# Patient Record
Sex: FEMALE | Race: WHITE | NOT HISPANIC OR LATINO | Employment: OTHER | URBAN - METROPOLITAN AREA
[De-identification: names, ages, dates, MRNs, and addresses within clinical notes are randomized per-mention and may not be internally consistent; named-entity substitution may affect disease eponyms.]

---

## 2020-12-30 ENCOUNTER — TRANSFERRED RECORDS (OUTPATIENT)
Dept: HEALTH INFORMATION MANAGEMENT | Facility: CLINIC | Age: 75
End: 2020-12-30

## 2021-01-21 ENCOUNTER — TRANSFERRED RECORDS (OUTPATIENT)
Dept: HEALTH INFORMATION MANAGEMENT | Facility: CLINIC | Age: 76
End: 2021-01-21

## 2021-03-12 ENCOUNTER — TRANSFERRED RECORDS (OUTPATIENT)
Dept: HEALTH INFORMATION MANAGEMENT | Facility: CLINIC | Age: 76
End: 2021-03-12

## 2021-04-15 ENCOUNTER — TRANSFERRED RECORDS (OUTPATIENT)
Dept: HEALTH INFORMATION MANAGEMENT | Facility: CLINIC | Age: 76
End: 2021-04-15

## 2021-05-03 ENCOUNTER — MEDICAL CORRESPONDENCE (OUTPATIENT)
Dept: HEALTH INFORMATION MANAGEMENT | Facility: CLINIC | Age: 76
End: 2021-05-03

## 2021-05-03 ENCOUNTER — TRANSFERRED RECORDS (OUTPATIENT)
Dept: HEALTH INFORMATION MANAGEMENT | Facility: CLINIC | Age: 76
End: 2021-05-03

## 2021-05-05 ENCOUNTER — TRANSCRIBE ORDERS (OUTPATIENT)
Dept: OTHER | Age: 76
End: 2021-05-05

## 2021-05-05 DIAGNOSIS — R29.898 BILATERAL ARM WEAKNESS: Primary | ICD-10-CM

## 2021-05-05 DIAGNOSIS — R44.9 SENSORY DEFICIT, BILATERAL: ICD-10-CM

## 2021-06-22 ENCOUNTER — PRE VISIT (OUTPATIENT)
Dept: NEUROLOGY | Facility: CLINIC | Age: 76
End: 2021-06-22

## 2021-06-22 NOTE — TELEPHONE ENCOUNTER
FUTURE VISIT INFORMATION      FUTURE VISIT INFORMATION:    Date: 6/28/2021    Time: 1pm    [place] [facility] [place]ERRAL INFORMATION:    Referring provider:  Dr. Pfeiffer    Referring providers clinic:  [facility]     Reason for visit/diagnosis  Bilateral Arm Weakness     RECORDS REQUESTED FROM:       Clinic name Comments Records Status Imaging Status   [facility] Pfeiffer-5/4/2021    MRI Head-1/22/2021    MRI Cervical Spine-12/30/2020 Care Everywhere Requested to PACS                                   [facility]22[facility]2021[facility]    6/28/2021-[facility] Images now in PACS-MR @ 528am

## 2021-06-28 ENCOUNTER — OFFICE VISIT (OUTPATIENT)
Dept: NEUROLOGY | Facility: CLINIC | Age: 76
End: 2021-06-28
Payer: COMMERCIAL

## 2021-06-28 VITALS
RESPIRATION RATE: 16 BRPM | WEIGHT: 195 LBS | HEART RATE: 82 BPM | SYSTOLIC BLOOD PRESSURE: 130 MMHG | DIASTOLIC BLOOD PRESSURE: 79 MMHG | OXYGEN SATURATION: 95 %

## 2021-06-28 DIAGNOSIS — M19.019 AC JOINT ARTHROPATHY: Primary | ICD-10-CM

## 2021-06-28 PROCEDURE — 99205 OFFICE O/P NEW HI 60 MIN: CPT | Mod: 95 | Performed by: PSYCHIATRY & NEUROLOGY

## 2021-06-28 RX ORDER — MONTELUKAST SODIUM 10 MG/1
TABLET ORAL
COMMUNITY
Start: 2021-06-17

## 2021-06-28 RX ORDER — ATORVASTATIN CALCIUM 10 MG/1
TABLET, FILM COATED ORAL
COMMUNITY
Start: 2021-05-04

## 2021-06-28 RX ORDER — FLUTICASONE PROPIONATE 50 MCG
1 SPRAY, SUSPENSION (ML) NASAL
COMMUNITY

## 2021-06-28 RX ORDER — BUDESONIDE AND FORMOTEROL FUMARATE DIHYDRATE 160; 4.5 UG/1; UG/1
AEROSOL RESPIRATORY (INHALATION)
COMMUNITY
Start: 2020-03-30

## 2021-06-28 RX ORDER — PANTOPRAZOLE SODIUM 40 MG/1
1 FOR SUSPENSION ORAL DAILY
COMMUNITY

## 2021-06-28 RX ORDER — ALBUTEROL SULFATE 90 UG/1
AEROSOL, METERED RESPIRATORY (INHALATION)
COMMUNITY
Start: 2021-05-17

## 2021-06-28 RX ORDER — ASCORBIC ACID 500 MG
500 TABLET ORAL
COMMUNITY

## 2021-06-28 RX ORDER — OMEPRAZOLE 10 MG/1
10 CAPSULE, DELAYED RELEASE ORAL
COMMUNITY
Start: 2020-12-31

## 2021-06-28 RX ORDER — WARFARIN SODIUM 5 MG/1
TABLET ORAL
COMMUNITY
Start: 2021-06-21

## 2021-06-28 RX ORDER — FUROSEMIDE 20 MG
20 TABLET ORAL
COMMUNITY
Start: 2020-12-17

## 2021-06-28 RX ORDER — ALENDRONATE SODIUM 70 MG/1
70 TABLET ORAL WEEKLY
COMMUNITY
Start: 2021-05-10

## 2021-06-28 ASSESSMENT — PAIN SCALES - GENERAL: PAINLEVEL: EXTREME PAIN (8)

## 2021-06-28 NOTE — PROGRESS NOTES
[facility] Neurology Consultation    Tameka Mcdaniel MRN# [id number]   Age: 76 year old YOB: 1945     Requesting physician: Provider Not In System  Erica Croft     Reason for Consultation: arm pain and weakness      History of Presenting Symptoms:   Tameka Mcdaniel is a 76 year old female who presents today for evaluation of arm pain and weakness.  In prior visits, the patient reported b/l arm pain with hand weakness since 10/2020 that had progressed over time.  She was seen with Neurosurgery 2021 at [facility] and this led to imaging that was somewhat unrevealing for etiology.  She then tried PT, and injections which were somewhat helpful.    The patient was seen with Neurology at [facility] 3/12/2021 for acute onset b/l neck and arm pain (aching, shooting pains down arm into 2nd and third finger).  Examination showed weakness in b/l Deltoid 3+, 4+ biceps, brachioradialis, triceps, finger extension/flexion/extention ~ all limited by pain.  Normal reflexes, impaired sensation in distal legs b/l.  Imaging showed moderate cerebral and cerebellar atrophy, and degenerative spondylosis of cervical spine (no critical stenosis).  Basic las as well as an EMG was done to further determine her etiology.  Comments made after EMG (below) indicate the patient was to see a rheumatologist for shoulder pain in consideration of polymyalgia rheumatica and/or polymyositis, as well as carcinomatosis with CT chest/abdomen/pelvis.  The patient was referred to a pain clinic, and reumatology on 2021 follow up with her PCP.    Serum on 3/13/2021: B12 (414), CK (48), Aldolase (5.7), MMA (0.18), A1c (6.0) - all normal except for mildly elevated A1c.    EM/15/2021 - minimally abnormal nerve conduction study because of newly reduced sensory nerve conduction velocities.  There is no evidence of median entrapment neuropathy at either wrist.  No specific comment can be made about cervical radiculopathy and/or myopathy because  needle EMG examination could not be done.    Today, the patient still has weakness in lifting objects with her hands (plates, light-weights).  She still has pain in both of her shoulders (not neck, not upper back).  This pain is described as an ache with sharp onset (like someone hit her).  The pain shoots down her arms at times, usually when she is active.  When it shoots down her arms, it goes anteriority toward her index and thumb.  She doesn't get this pain with neck extension or flexion.  There have been no new symptoms developing since onset in 10/2020.  Her onset of pain and weakness was relatively acute, as she describes waking up with her pain.  As long as she keeps her hands and arms in front of her, she only has dull shoulder and proximal arm pain.      Past Medical History:   GERD  HLD  Aortic stenosis  2012, s/p valve replacement 5/2/2002 (on chronic anticoagulation)  TMJ  Venous stasis     Past Surgical History:   Tonsil and adenoidectomy  Cataract extraction b/l     Social History:   Smoked (quit 25 years ago). Doesn't drink.    Family Hx:  Mother - Pick's disease  Brother - likely Alzheimer's dementia     Medications:     Current Outpatient Medications   Medication     albuterol (PROAIR HFA/PROVENTIL HFA/VENTOLIN HFA) 108 (90 Base) MCG/ACT inhaler     alendronate (FOSAMAX) 70 MG tablet     budesonide-formoterol (SYMBICORT) 160-4.5 MCG/ACT Inhaler     fluticasone-salmeterol (ADVAIR) 250-50 MCG/DOSE inhaler     furosemide (LASIX) 20 MG tablet     omeprazole (PRILOSEC) 10 MG DR capsule     atorvastatin (LIPITOR) 10 MG tablet     cholecalciferol 25 MCG (1000 UT) TABS     fluticasone (FLONASE) 50 MCG/ACT nasal spray     JANTOVEN ANTICOAGULANT 5 MG tablet     montelukast (SINGULAIR) 10 MG tablet     vitamin C (ASCORBIC ACID) 500 MG tablet      Physical Exam:   Vitals: /79   Pulse 82   Resp 16   Wt 88.5 kg (195 lb)   SpO2 95%    General: Seated comfortably in no acute distress. Laughing often,  difficulty with complex directions.  HEENT: Neck supple with normal range of motion. No paracervical muscle tenderness or tightness. Anterior scalene insertion, AC joint, and coracoid process joint pressure leads to extreme pain and return of symptoms reported above.  B/l field cut on inferior visual field in both eyes.  Skin: No rashes  Neurologic:     Mental Status: Fully alert, attentive and oriented. Speech clear and fluent, no paraphasic errors.      Cranial Nerves: Visual fields intact. PERRL. EOMI with normal smooth pursuit. Facial sensation intact/symmetric. Facial movements symmetric. Hearing not formally tested but intact to conversation. Palate elevation symmetric, uvula midline. No dysarthria. Shoulder shrug strong bilaterally. Tongue protrusion midline. SCM 5/5 b/l.      Motor: No tremors or other abnormal movements observed. Muscle tone normal throughout. No pronator drift. Normal/symmetric rapid finger tapping. Strength 5/5 throughout upper and lower extremities (some pain limitations to most movements, but patient can overcome with full effort.  No weakness noted with any movements in particular SA, BF, TE, WE, WF, FF, index extension, thumb opposition, thumb flexion, Finger abduction, HF, HE, KE, KF, DF, PF).     Deep Tendon Reflexes: 2+/symmetric throughout upper and lower extremities (no spread). No clonus. Toes downgoing bilaterally.     Sensory: Intact/symmetric to light touch, pinprick, temperature, vibration and proprioception throughout upper and lower extremities (no dermatomal loss or change in sensation along C3, 4, 5, 6, 7, 8 dermatome). Negative Romberg (hesitant and wary of test, but doesn't fall or sway even with high degree of anxiety)     Coordination: Finger-nose-finger with some intention tremor and ataxia.  Rapid alternating movements intact/symmetric with normal speed and rhythm.     Gait: Normal, steady casual gait. Tandem is poor due to hesitancy.         Data: Pertinent prior  to visit   Imagin2021: MRI head with and without contrast: Moderate cerebral and cerebellar atrophy and mild to moderate small vessel disease    2020: MRI cervical spine: Multilevel degenerative spondylosis, C5-6 disc osteophyte abuts the anterior portion of the cord. Moderate left neural foraminal narrowing.          Assessment and Plan:   Assessment:  B/l AC joint and shoulder arthropathy    The patient has no noted weakness on exam not related to shoulder pain, has + findings on AC joint and coracoid process palpation reproducing symptoms, and has cervical radicular signs (neck compression, no dermatome or myotome changes).  Overall, while certainly rheumatological etiologies and neruological etiologies remain for b/l arm pain, her symptoms are more characteristic for musculoskeletal arthropathy or inflammation and should be investigated further with an orthopedics specialist or her PCP.  If treatment or w/up in this regard is negative, then repeat EMG can be done at/near our follow up visit. I did ask the patient to consider further treatment of her pain with a pain clinic as well.     Plan:  Orthopedics referral for b/l shoulder inflammation    Follow up in Neurology clinic in 2 months or should new concerns arise.    LIZZ Solares D.O.   of Neurology      Total time  today (62 min) in this patient encounter was spent on pre-charting, counseling and/or coordination of care. We reviewed diagnostic results, impressions, and discussed other possible tests if symptoms do not improve. We discussed the implications of the diagnosis, as well as risks and benefits of management options. We reviewed treatment instructions and our scheduled follow-up as specified in the discharge plan. We also discussed the importance of compliance with the chosen course of treatment. The patient is in agreement with this plan and has no further questions.

## 2021-06-28 NOTE — NURSING NOTE
Chief Complaint   Patient presents with     Consult     [facility] NEW - bilateral arm weakness     Samuel Quintana

## 2021-06-28 NOTE — LETTER
6/28/2021       RE: Tameka Mcdaniel  [address]  [facility] [place] [address]     Dear Colleague,    Thank you for referring your patient, Tameka Mcdaniel, to the [facility] [place] at [facility]. Please see a copy of my visit note below.    [facility] Neurology Consultation    Tameka Mcdaniel MRN# [id number]   Age: 76 year old YOB: 1945     Requesting physician: Provider Not In System  Erica Croft     Reason for Consultation: arm pain and weakness      History of Presenting Symptoms:   Tameka Mcdaniel is a 76 year old female who presents today for evaluation of arm pain and weakness.  In prior visits, the patient reported b/l arm pain with hand weakness since 10/2020 that had progressed over time.  She was seen with Neurosurgery 1/12/2021 at [facility] and this led to imaging that was somewhat unrevealing for etiology.  She then tried PT, and injections which were somewhat helpful.    The patient was seen with Neurology at [facility] 3/12/2021 for acute onset b/l neck and arm pain (aching, shooting pains down arm into 2nd and third finger).  Examination showed weakness in b/l Deltoid 3+, 4+ biceps, brachioradialis, triceps, finger extension/flexion/extention ~ all limited by pain.  Normal reflexes, impaired sensation in distal legs b/l.  Imaging showed moderate cerebral and cerebellar atrophy, and degenerative spondylosis of cervical spine (no critical stenosis).  Basic las as well as an EMG was done to further determine her etiology.  Comments made after EMG (below) indicate the patient was to see a rheumatologist for shoulder pain in consideration of polymyalgia rheumatica and/or polymyositis, as well as carcinomatosis with CT chest/abdomen/pelvis.  The patient was referred to a pain clinic, and reumatology on 5/4/2021 follow up with her PCP.    Serum on 3/13/2021: B12 (414), CK (48), Aldolase (5.7), MMA (0.18), A1c (6.0) - all normal except for  mildly elevated A1c.    EM/15/2021 - minimally abnormal nerve conduction study because of newly reduced sensory nerve conduction velocities.  There is no evidence of median entrapment neuropathy at either wrist.  No specific comment can be made about cervical radiculopathy and/or myopathy because needle EMG examination could not be done.    Today, the patient still has weakness in lifting objects with her hands (plates, light-weights).  She still has pain in both of her shoulders (not neck, not upper back).  This pain is described as an ache with sharp onset (like someone hit her).  The pain shoots down her arms at times, usually when she is active.  When it shoots down her arms, it goes anteriority toward her index and thumb.  She doesn't get this pain with neck extension or flexion.  There have been no new symptoms developing since onset in 10/2020.  Her onset of pain and weakness was relatively acute, as she describes waking up with her pain.  As long as she keeps her hands and arms in front of her, she only has dull shoulder and proximal arm pain.      Past Medical History:   GERD  HLD  Aortic stenosis  , s/p valve replacement 2002 (on chronic anticoagulation)  TMJ  Venous stasis     Past Surgical History:   Tonsil and adenoidectomy  Cataract extraction b/l     Social History:   Smoked (quit 25 years ago). Doesn't drink.    Family Hx:  Mother - Pick's disease  Brother - likely Alzheimer's dementia     Medications:     Current Outpatient Medications   Medication     albuterol (PROAIR HFA/PROVENTIL HFA/VENTOLIN HFA) 108 (90 Base) MCG/ACT inhaler     alendronate (FOSAMAX) 70 MG tablet     budesonide-formoterol (SYMBICORT) 160-4.5 MCG/ACT Inhaler     fluticasone-salmeterol (ADVAIR) 250-50 MCG/DOSE inhaler     furosemide (LASIX) 20 MG tablet     omeprazole (PRILOSEC) 10 MG DR capsule     atorvastatin (LIPITOR) 10 MG tablet     cholecalciferol 25 MCG (1000 UT) TABS     fluticasone (FLONASE) 50 MCG/ACT  nasal spray     JANTOVEN ANTICOAGULANT 5 MG tablet     montelukast (SINGULAIR) 10 MG tablet     vitamin C (ASCORBIC ACID) 500 MG tablet      Physical Exam:   Vitals: /79   Pulse 82   Resp 16   Wt 88.5 kg (195 lb)   SpO2 95%    General: Seated comfortably in no acute distress. Laughing often, difficulty with complex directions.  HEENT: Neck supple with normal range of motion. No paracervical muscle tenderness or tightness. Anterior scalene insertion, AC joint, and coracoid process joint pressure leads to extreme pain and return of symptoms reported above.  B/l field cut on inferior visual field in both eyes.  Skin: No rashes  Neurologic:     Mental Status: Fully alert, attentive and oriented. Speech clear and fluent, no paraphasic errors.      Cranial Nerves: Visual fields intact. PERRL. EOMI with normal smooth pursuit. Facial sensation intact/symmetric. Facial movements symmetric. Hearing not formally tested but intact to conversation. Palate elevation symmetric, uvula midline. No dysarthria. Shoulder shrug strong bilaterally. Tongue protrusion midline. SCM 5/5 b/l.      Motor: No tremors or other abnormal movements observed. Muscle tone normal throughout. No pronator drift. Normal/symmetric rapid finger tapping. Strength 5/5 throughout upper and lower extremities (some pain limitations to most movements, but patient can overcome with full effort.  No weakness noted with any movements in particular SA, BF, TE, WE, WF, FF, index extension, thumb opposition, thumb flexion, Finger abduction, HF, HE, KE, KF, DF, PF).     Deep Tendon Reflexes: 2+/symmetric throughout upper and lower extremities (no spread). No clonus. Toes downgoing bilaterally.     Sensory: Intact/symmetric to light touch, pinprick, temperature, vibration and proprioception throughout upper and lower extremities (no dermatomal loss or change in sensation along C3, 4, 5, 6, 7, 8 dermatome). Negative Romberg (hesitant and wary of test, but  doesn't fall or sway even with high degree of anxiety)     Coordination: Finger-nose-finger with some intention tremor and ataxia.  Rapid alternating movements intact/symmetric with normal speed and rhythm.     Gait: Normal, steady casual gait. Tandem is poor due to hesitancy.         Data: Pertinent prior to visit   Imagin2021: MRI head with and without contrast: Moderate cerebral and cerebellar atrophy and mild to moderate small vessel disease    2020: MRI cervical spine: Multilevel degenerative spondylosis, C5-6 disc osteophyte abuts the anterior portion of the cord. Moderate left neural foraminal narrowing.          Assessment and Plan:   Assessment:  B/l AC joint and shoulder arthropathy    The patient has no noted weakness on exam not related to shoulder pain, has + findings on AC joint and coracoid process palpation reproducing symptoms, and has cervical radicular signs (neck compression, no dermatome or myotome changes).  Overall, while certainly rheumatological etiologies and neruological etiologies remain for b/l arm pain, her symptoms are more characteristic for musculoskeletal arthropathy or inflammation and should be investigated further with an orthopedics specialist or her PCP.  If treatment or w/up in this regard is negative, then repeat EMG can be done at/near our follow up visit. I did ask the patient to consider further treatment of her pain with a pain clinic as well.     Plan:  Orthopedics referral for b/l shoulder inflammation    Follow up in Neurology clinic in 2 months or should new concerns arise.    LIZZ Solares D.O.   of Neurology      Total time  today (62 min) in this patient encounter was spent on pre-charting, counseling and/or coordination of care. We reviewed diagnostic results, impressions, and discussed other possible tests if symptoms do not improve. We discussed the implications of the diagnosis, as well as risks and benefits of management  options. We reviewed treatment instructions and our scheduled follow-up as specified in the discharge plan. We also discussed the importance of compliance with the chosen course of treatment. The patient is in agreement with this plan and has no further questions.        Again, thank you for allowing me to participate in the care of your patient.      Sincerely,    Israel Solares, DO

## 2021-06-28 NOTE — PATIENT INSTRUCTIONS
You have extreme pain with palpation to certain regions of your shoulder, along with pain that doesn't radiate in a usual dermatome or radiculopathy way.  I suspect you have arthropathy/joint injury to your shoulders leading to arm pain.  You may benefit from seeing a sports medicine doctor/orthopedics doctor about this.  - Orthopedics referral    If treatment with orthopedics isn't helpful, then repeat EMG will be needed, and this can be done or discussed at our return visit in 2-3 months.

## 2021-06-29 NOTE — TELEPHONE ENCOUNTER
DIAGNOSIS: AC joint arthropathy /Dr Solares/ no images/ [facility]/ ortho con   APPOINTMENT DATE: 6/30/21   NOTES STATUS DETAILS   OFFICE NOTE from referring provider Internal Israel Solares DO in [facility]   OFFICE NOTE from other specialist Care Everywhere [facility]   DISCHARGE SUMMARY from hospital N/A    DISCHARGE REPORT from the ER N/A    OPERATIVE REPORT N/A    EMG report recieved 4/15/21  Scanned to chart   MEDICATION LIST Internal    MRI Received CERVICAL SPINE 12/30/20   DEXA (osteoporosis/bone health) N/A    CT SCAN N/A    XRAYS (IMAGES & REPORTS) Received  RIGHT SHOULDER 9/3/20     Action 6/29/21 [facility]   Action Taken REQUESTED IMAGING FROM [facility]

## 2021-06-30 ENCOUNTER — ANCILLARY PROCEDURE (OUTPATIENT)
Dept: GENERAL RADIOLOGY | Facility: CLINIC | Age: 76
End: 2021-06-30
Attending: FAMILY MEDICINE
Payer: COMMERCIAL

## 2021-06-30 ENCOUNTER — PRE VISIT (OUTPATIENT)
Dept: ORTHOPEDICS | Facility: CLINIC | Age: 76
End: 2021-06-30

## 2021-06-30 ENCOUNTER — OFFICE VISIT (OUTPATIENT)
Dept: ORTHOPEDICS | Facility: CLINIC | Age: 76
End: 2021-06-30
Payer: COMMERCIAL

## 2021-06-30 DIAGNOSIS — G89.29 CHRONIC PAIN OF BOTH SHOULDERS: Primary | ICD-10-CM

## 2021-06-30 DIAGNOSIS — M25.511 CHRONIC PAIN OF BOTH SHOULDERS: Primary | ICD-10-CM

## 2021-06-30 DIAGNOSIS — M25.512 CHRONIC PAIN OF BOTH SHOULDERS: Primary | ICD-10-CM

## 2021-06-30 PROCEDURE — 99205 OFFICE O/P NEW HI 60 MIN: CPT | Performed by: FAMILY MEDICINE

## 2021-06-30 PROCEDURE — 73030 X-RAY EXAM OF SHOULDER: CPT | Mod: RT | Performed by: RADIOLOGY

## 2021-06-30 NOTE — PROGRESS NOTES
CHIEF COMPLAINT:  Pain of the Right Shoulder and Pain of the Left Shoulder       HISTORY OF PRESENT ILLNESS  Ms. Mcdaniel is a pleasant 76 year old year old female who presents to clinic today with bilateral shoulder pain and weakness.  Tameka explains that she gets a sharp, shooting, throbbing pain down both arms and sometimes into her hands. She was seen in Neurology and had an EMG completed.  Fortunately she was on anticoagulation and could not proceed with EMG portion of test.  Neurology deemed condition likely orthopedic and referred to our office for further investigation.    Labs by PCP including inflammatory markers, creatinine, aldolase,  Negative  MRI head and cervical negative.    Onset: gradual  Location: bilateral shoulder  Quality:  stabbing, sharp, shooting and throbing  Duration: 6 months   Severity: 10/10 at worst  Timing:constant  Modifying factors:  resting and non-use makes it better, movement and use makes it worse  Associated signs & symptoms: pain  Previous similar pain: No  Treatments to date:Had an injection in both shoulders back in October unsure what kind and that only provided about 6 weeks of relief.     Additional history: as documented    Review of Systems:    Have you recently had a a fever, chills, weight loss? No    Do you have any vision problems? No    Do you have any chest pain or edema? No    Do you have any shortness of breath or wheezing?  No    Do you have stomach problems? No    Do you have any numbness or focal weakness? No    Do you have diabetes? No    Do you have problems with bleeding or clotting? Yes, on Warfarin     Do you have an rashes or other skin lesions? No    MEDICAL HISTORY  There is no problem list on file for this patient.      Current Outpatient Medications   Medication Sig Dispense Refill     albuterol (PROAIR HFA/PROVENTIL HFA/VENTOLIN HFA) 108 (90 Base) MCG/ACT inhaler INHALE 1-2 PUFFS EVERY 4 HOURS AS NEEDED FOR COUGH, SHORTNESS OF BREATH OR WITH EXERCISE        alendronate (FOSAMAX) 70 MG tablet Take 70 mg by mouth once a week       atorvastatin (LIPITOR) 10 MG tablet TAKE 1 TABLET (10 MG) BY MOUTH DAILY AT BEDTIME.       budesonide-formoterol (SYMBICORT) 160-4.5 MCG/ACT Inhaler        cholecalciferol 25 MCG (1000 UT) TABS Take 2 tablets by mouth       fluticasone (FLONASE) 50 MCG/ACT nasal spray Spray 1 spray in nostril       fluticasone-salmeterol (ADVAIR) 250-50 MCG/DOSE inhaler Inhale 1 puff into the lungs every 12 hours       furosemide (LASIX) 20 MG tablet Take 20 mg by mouth       JANTOVEN ANTICOAGULANT 5 MG tablet TAKE 1 1/2 TABLETS BY MOUTH ON MONDAY,WEDNESDAY, FRIDAY AND TAKE 1 TABLET ALL OTHER DAYS OF THE WEEK       montelukast (SINGULAIR) 10 MG tablet TAKE 1 TABLET (10 MG) BY MOUTH DAILY AT BEDTIME.       omeprazole (PRILOSEC) 10 MG DR capsule Take 10 mg by mouth       pantoprazole sodium (PROTONIX) 40 MG packet Take 1 packet by mouth daily       vitamin C (ASCORBIC ACID) 500 MG tablet Take 500 mg by mouth         Allergies   Allergen Reactions     Hydrocodone-Acetaminophen Nausea and Vomiting and Other (See Comments)     Made her feel strange       Penicillins Hives     Iodine Rash     With d & c, years ago; not sure about iodine on skin       No family history on file.    Additional medical/Social/Surgical histories reviewed in [facility] and updated as appropriate.       PHYSICAL EXAM  There were no vitals taken for this visit.    General  - normal appearance, in no obvious distress  HEENT  - conjunctivae not injected, moist mucous membranes  CV  - normal radial pulse  Pulm  - normal respiratory pattern, non-labored  Musculoskeletal - bilateral shoulders  - inspection: normal bone and joint alignment, no obvious deformity  - palpation: tender RC insertions, tenderness palpation mildly at AC joint right greater than left.  Tender to palpation anterior joint lines.  Tenderness to palpation posteriorly along infraspinatus.  - ROM:  painful and limited flexion and  abduction.  Internal rotation decreased with pain  - strength: 4/5 supraspinatus, 4+ infraspinatus, 4+ subscapularis  - special tests:  (-) Speed's  (+) Neer  (+) Hawkin's  (+) Carmina's pain weakness  (-) Columbus's  (-) apprehension  (-) subscap lift-off  Neuro  - no sensory or motor deficit, grossly normal coordination, normal muscle tone  Skin  - no ecchymosis, erythema, warmth, or induration, no obvious rash  Psych  - interactive, appropriate, normal mood and affect    IMAGING : XR Shoulder bilateral 4V. Final results and radiologist's interpretation, available in the [facility] health record. Images were reviewed with the patient/family members in the office today. My personal interpretation of the performed imaging is no significant acute osseous abnormality.  Right shoulder with moderate degenerative changes.  No significant generative changes on the left shoulder.    Footprint of humeral head on right with irregularities concern for possible rotator cuff pathology.    X-ray read reviewed from 9/3/2020    Interface, Rad - 09/03/2020 12:04 PM CDT  Formatting of this note might be different from the original.  EXAM:  XR SHOULDER ROUT RT 2 OR MORE VIEWS    INDICATION:  Acute pain of right shoulder    COMPARISON:  None.    FINDINGS:  AP internal, AP external, Grashey a and axillary views were obtained.  There  are no fractures, dislocation, radiopaque foreign bodies.  The joint spaces are  maintained.  There are no fractures, dislocation, lytic, or blastic lesions.    IMPRESSION:  Moderate osteoarthritis of the glenohumeral joint with inferior subluxation  relative to the glenoid.    MRI head with moderate generalized cerebral and cerebellar atrophy.    EMG 4/15/21      Laboratory studies  CRP normal 5.5  Methylmalonic acid, normal  Vitamin B12 normal  Aldolase 5.7  Creatine kinase normal 48  A1c 6.0    ASSESSMENT & PLAN  Ms. Mcdaniel is a 76 year old year old female who presents to clinic today with ongoing chronic bilateral  "shoulder pain, weakness as well as subjective extremity weakness and \"paresthesias\" down to her hands.  She was referred on to us by her neurologist for possible orthopedic cause to explain the symptoms as initially suspected neurologic causes was thought to be less likely.    Diagnosis: Pain of bilateral shoulders    She is been previously evaluated by multiple providers including her PCP with negative laboratory work-up for any myositis or PMR.  She also been worked up by neurology and has had a negative MRI of head as well as negative cervical MRI for contributory findings.  EMG was started, but cannot perform due to anticoagulated state at [facility].  Today on examination, bilateral shoulder pain is concerning for possible glenohumeral osteoarthritis, which is more evident on her right shoulder x-ray.  Also possible rotator cuff pathology, less likely thoracic outlet syndrome.    At this time we discussed consideration for MRI of shoulders versus diagnostic/therapeutic glenohumeral injection under ultrasound guidance.  At this time we agreed to pursue ultrasound-guided glenohumeral injections.  She will be scheduled next week in [place] for these.  We will use a symptom diary after these injections to determine whether she has significant relief of pain, weakness.  If there is no improvement, I would then consider an MRI of her shoulders.  Lastly if this does not change her subjective paresthesias I would then refer her back to her neurologist to consider moving forward with an EMG.      60 minutes on date of the encounter doing chart review, history and examination, independent imaging review, documentation, and additional activities noted above.      It was a pleasure seeing Tameka today.    Pelon Arce DO, [facility]  Primary Care Sports Medicine  "

## 2021-06-30 NOTE — LETTER
6/30/2021      RE: Tameka Mcdaniel  [address]  [facility] [address]       CHIEF COMPLAINT:  Pain of the Right Shoulder and Pain of the Left Shoulder       HISTORY OF PRESENT ILLNESS  Ms. Mcdaniel is a pleasant 76 year old year old female who presents to clinic today with bilateral shoulder pain and weakness.  Tameka explains that she gets a sharp, shooting, throbbing pain down both arms and sometimes into her hands. She was seen in Neurology and had an EMG completed.  Fortunately she was on anticoagulation and could not proceed with EMG portion of test.  Neurology deemed condition likely orthopedic and referred to our office for further investigation.    Labs by PCP including inflammatory markers, creatinine, aldolase,  Negative  MRI head and cervical negative.    Onset: gradual  Location: bilateral shoulder  Quality:  stabbing, sharp, shooting and throbing  Duration: 6 months   Severity: 10/10 at worst  Timing:constant  Modifying factors:  resting and non-use makes it better, movement and use makes it worse  Associated signs & symptoms: pain  Previous similar pain: No  Treatments to date:Had an injection in both shoulders back in October unsure what kind and that only provided about 6 weeks of relief.     Additional history: as documented    Review of Systems:    Have you recently had a a fever, chills, weight loss? No    Do you have any vision problems? No    Do you have any chest pain or edema? No    Do you have any shortness of breath or wheezing?  No    Do you have stomach problems? No    Do you have any numbness or focal weakness? No    Do you have diabetes? No    Do you have problems with bleeding or clotting? Yes, on Warfarin     Do you have an rashes or other skin lesions? No    MEDICAL HISTORY  There is no problem list on file for this patient.      Current Outpatient Medications   Medication Sig Dispense Refill     albuterol (PROAIR HFA/PROVENTIL HFA/VENTOLIN HFA) 108 (90 Base) MCG/ACT inhaler INHALE 1-2  PUFFS EVERY 4 HOURS AS NEEDED FOR COUGH, SHORTNESS OF BREATH OR WITH EXERCISE       alendronate (FOSAMAX) 70 MG tablet Take 70 mg by mouth once a week       atorvastatin (LIPITOR) 10 MG tablet TAKE 1 TABLET (10 MG) BY MOUTH DAILY AT BEDTIME.       budesonide-formoterol (SYMBICORT) 160-4.5 MCG/ACT Inhaler        cholecalciferol 25 MCG (1000 UT) TABS Take 2 tablets by mouth       fluticasone (FLONASE) 50 MCG/ACT nasal spray Spray 1 spray in nostril       fluticasone-salmeterol (ADVAIR) 250-50 MCG/DOSE inhaler Inhale 1 puff into the lungs every 12 hours       furosemide (LASIX) 20 MG tablet Take 20 mg by mouth       JANTOVEN ANTICOAGULANT 5 MG tablet TAKE 1 1/2 TABLETS BY MOUTH ON MONDAY,WEDNESDAY, FRIDAY AND TAKE 1 TABLET ALL OTHER DAYS OF THE WEEK       montelukast (SINGULAIR) 10 MG tablet TAKE 1 TABLET (10 MG) BY MOUTH DAILY AT BEDTIME.       omeprazole (PRILOSEC) 10 MG DR capsule Take 10 mg by mouth       pantoprazole sodium (PROTONIX) 40 MG packet Take 1 packet by mouth daily       vitamin C (ASCORBIC ACID) 500 MG tablet Take 500 mg by mouth         Allergies   Allergen Reactions     Hydrocodone-Acetaminophen Nausea and Vomiting and Other (See Comments)     Made her feel strange       Penicillins Hives     Iodine Rash     With d & c, years ago; not sure about iodine on skin       No family history on file.    Additional medical/Social/Surgical histories reviewed in [facility] and updated as appropriate.       PHYSICAL EXAM  There were no vitals taken for this visit.    General  - normal appearance, in no obvious distress  HEENT  - conjunctivae not injected, moist mucous membranes  CV  - normal radial pulse  Pulm  - normal respiratory pattern, non-labored  Musculoskeletal - bilateral shoulders  - inspection: normal bone and joint alignment, no obvious deformity  - palpation: tender RC insertions, tenderness palpation mildly at AC joint right greater than left.  Tender to palpation anterior joint lines.  Tenderness to  palpation posteriorly along infraspinatus.  - ROM:  painful and limited flexion and abduction.  Internal rotation decreased with pain  - strength: 4/5 supraspinatus, 4+ infraspinatus, 4+ subscapularis  - special tests:  (-) Speed's  (+) Neer  (+) Hawkin's  (+) Carmina's pain weakness  (-) Stutsman's  (-) apprehension  (-) subscap lift-off  Neuro  - no sensory or motor deficit, grossly normal coordination, normal muscle tone  Skin  - no ecchymosis, erythema, warmth, or induration, no obvious rash  Psych  - interactive, appropriate, normal mood and affect    IMAGING : XR Shoulder bilateral 4V. Final results and radiologist's interpretation, available in the [facility] health record. Images were reviewed with the patient/family members in the office today. My personal interpretation of the performed imaging is no significant acute osseous abnormality.  Right shoulder with moderate degenerative changes.  No significant generative changes on the left shoulder.    Footprint of humeral head on right with irregularities concern for possible rotator cuff pathology.    X-ray read reviewed from 9/3/2020    Interface, Rad - 09/03/2020 12:04 PM CDT  Formatting of this note might be different from the original.  EXAM:  XR SHOULDER ROUT RT 2 OR MORE VIEWS    INDICATION:  Acute pain of right shoulder    COMPARISON:  None.    FINDINGS:  AP internal, AP external, Grashey a and axillary views were obtained.  There  are no fractures, dislocation, radiopaque foreign bodies.  The joint spaces are  maintained.  There are no fractures, dislocation, lytic, or blastic lesions.    IMPRESSION:  Moderate osteoarthritis of the glenohumeral joint with inferior subluxation  relative to the glenoid.    MRI head with moderate generalized cerebral and cerebellar atrophy.    EMG 4/15/21      Laboratory studies  CRP normal 5.5  Methylmalonic acid, normal  Vitamin B12 normal  Aldolase 5.7  Creatine kinase normal 48  A1c 6.0    ASSESSMENT & PLAN  Ms. Mcdaniel is a 76  "year old year old female who presents to clinic today with ongoing chronic bilateral shoulder pain, weakness as well as subjective extremity weakness and \"paresthesias\" down to her hands.  She was referred on to us by her neurologist for possible orthopedic cause to explain the symptoms as initially suspected neurologic causes was thought to be less likely.    Diagnosis: Pain of bilateral shoulders    She is been previously evaluated by multiple providers including her PCP with negative laboratory work-up for any myositis or PMR.  She also been worked up by neurology and has had a negative MRI of head as well as negative cervical MRI for contributory findings.  EMG was started, but cannot perform due to anticoagulated state at [facility].  Today on examination, bilateral shoulder pain is concerning for possible glenohumeral osteoarthritis, which is more evident on her right shoulder x-ray.  Also possible rotator cuff pathology, less likely thoracic outlet syndrome.    At this time we discussed consideration for MRI of shoulders versus diagnostic/therapeutic glenohumeral injection under ultrasound guidance.  At this time we agreed to pursue ultrasound-guided glenohumeral injections.  She will be scheduled next week in [place] for these.  We will use a symptom diary after these injections to determine whether she has significant relief of pain, weakness.  If there is no improvement, I would then consider an MRI of her shoulders.  Lastly if this does not change her subjective paresthesias I would then refer her back to her neurologist to consider moving forward with an EMG.      60 minutes on date of the encounter doing chart review, history and examination, independent imaging review, documentation, and additional activities noted above.      It was a pleasure seeing Tameka today.    Pelon Arce DO, [facility]  Primary Care Sports Medicine      Pelon Arce DO    "

## 2021-07-09 ENCOUNTER — OFFICE VISIT (OUTPATIENT)
Dept: ORTHOPEDICS | Facility: CLINIC | Age: 76
End: 2021-07-09
Payer: COMMERCIAL

## 2021-07-09 DIAGNOSIS — G89.29 CHRONIC PAIN OF BOTH SHOULDERS: Primary | ICD-10-CM

## 2021-07-09 DIAGNOSIS — M25.511 CHRONIC PAIN OF BOTH SHOULDERS: Primary | ICD-10-CM

## 2021-07-09 DIAGNOSIS — M25.512 CHRONIC PAIN OF BOTH SHOULDERS: Primary | ICD-10-CM

## 2021-07-09 PROCEDURE — 20611 DRAIN/INJ JOINT/BURSA W/US: CPT | Mod: 50 | Performed by: FAMILY MEDICINE

## 2021-07-09 PROCEDURE — 99207 PR DROP WITH A PROCEDURE: CPT | Performed by: FAMILY MEDICINE

## 2021-07-09 RX ORDER — METHYLPREDNISOLONE ACETATE 80 MG/ML
80 INJECTION, SUSPENSION INTRA-ARTICULAR; INTRALESIONAL; INTRAMUSCULAR; SOFT TISSUE
Status: SHIPPED | OUTPATIENT
Start: 2021-07-09

## 2021-07-09 RX ADMIN — METHYLPREDNISOLONE ACETATE 80 MG: 80 INJECTION, SUSPENSION INTRA-ARTICULAR; INTRALESIONAL; INTRAMUSCULAR; SOFT TISSUE at 10:56

## 2021-07-09 NOTE — PROGRESS NOTES
PROCEDURE ENCOUNTER    [facility]  Orthopedics  Pelon Arce,   2021     Name: Tameka Mcdaniel  MRN: [id number]  Age: 76 year old  : 1945    Referring provider: BREANNA  Diagnosis:Chronic pain of bilateral shoulders    Glenohumeral Injection - Ultrasound Guided  The patient was informed of the risks and the benefits of the procedure and a written consent was signed.  The patient s Left shoulder was prepped with chlorhexidine in sterile fashion.    Local anesthesia was performed using a 27-gauge 1.5-inch needle to administer 3 mL of 1% lidocaine without epi.  80 mg of methylprednisolone suspension was drawn up into a 5 mL syringe with 3 mL of 1% lidocaine w/o Epi.  Injection was performed using sterile technique.  Under ultrasound guidance a 3.5-inch 22-gauge needle was used to enter the glenohumeral joint.  Posterior approach was used with the patient in lateral recumbent position, arm in neutral position at the side.  Needle placement was visualized and documented with ultrasound.  Ultrasound visualization was necessary due to the small joint space entered.  Injection performed long axis to the probe.  Injection solution visualized within the joint space.  Images were permanently stored for the patient's record.  Procedure above repeated for right shoulder using 80 mg of methylprednisolone suspension was drawn up into a 5 mL syringe with 3 mL of 1% lidocaine w/o Epi.   There were no complications. The patient tolerated the procedure well. There was negligible bleeding.   Therapy scheduled to follow for mobilization.  The patient was instructed to call or go to the emergency room with any unusual pain, swelling, redness, or if otherwise concerned.          [facility]   ORTHOPEDICS & SPORTS MEDICINE  [address] [place] [address]  Dept: [phone]  ______________________________________________________________________________    Patient: Tameka Mcdaniel   : 1945   MRN:  [id number]   2021    INVASIVE PROCEDURE SAFETY CHECKLIST    Date: 21  Procedure:Bilateral shoulder GH injections under ultrasound guidance   Patient Name: Tameka Mcdaniel  MRN: [id number]  YOB: 1945    Action: Complete sections as appropriate. Any discrepancy results in a HARD COPY until resolved.     PRE PROCEDURE:  Patient ID verified with 2 identifiers (name and  or MRN): Yes  Procedure and site verified with patient/designee (when able): Yes  Accurate consent documentation in medical record: Yes  H&P (or appropriate assessment) documented in medical record: NA  H&P must be up to 20 days prior to procedure and updates within 24 hours of procedure as applicable: NA  Relevant diagnostic and radiology test results appropriately labeled and displayed as applicable: Yes  Procedure site(s) marked with provider initials: NA    TIMEOUT:  Time-Out performed immediately prior to starting procedure, including verbal and active participation of all team members addressing the following:Yes  * Correct patient identify  * Confirmed that the correct side and site are marked  * An accurate procedure consent form  * Agreement on the procedure to be done  * Correct patient position  * Relevant images and results are properly labeled and appropriately displayed  * The need to administer antibiotics or fluids for irrigation purposes during the procedure as applicable   * Safety precautions based on patient history or medication use    DURING PROCEDURE: Verification of correct person, site, and procedures any time the responsibility for care of the patient is transferred to another member of the care team.       Prior to injection, verified patient identity using patient's name and date of birth.  Due to injection administration, patient instructed to remain in clinic for 15 minutes  afterwards, and to report any adverse reaction to me immediately.    Joint injection was performed.      Drug Amount Wasted:   None.  Vial/Syringe: Single dose vial  Expiration Date:  01/01/2022      Graciela Gallegos, [facility]  July 9, 2021    Large Joint Injection/Arthocentesis: bilateral glenohumeral    Date/Time: 7/9/2021 10:56 AM  Performed by: Pelon Arce DO  Authorized by: Pelon Arce DO     Indications:  Pain  Needle Size:  22 G  Guidance: ultrasound    Approach:  Posterolateral  Location:  Shoulder  Laterality:  Bilateral      Site:  Bilateral glenohumeral  Medications (Right):  80 mg methylPREDNISolone 80 MG/ML  Medications (Left):  80 mg methylPREDNISolone 80 MG/ML  Procedure discussed: discussed risks, benefits, and alternatives    Consent Given by:  Patient  Timeout: timeout called immediately prior to procedure    Prep: patient was prepped and draped in usual sterile fashion     NDC: 2204-0499-69

## 2021-07-09 NOTE — LETTER
2021         RE: Tameka Mcdaniel  [address]  [facility] [address]        Dear Colleague,    Thank you for referring your patient, Tameka Mcdaniel, to the [facility] [place]. Please see a copy of my visit note below.      PROCEDURE ENCOUNTER    [facility]  Orthopedics  Pelon Arce,   2021     Name: Tameka Mcdaniel  MRN: [id number]  Age: 76 year old  : 1945    Referring provider: BREANNA  Diagnosis:Chronic pain of bilateral shoulders    Glenohumeral Injection - Ultrasound Guided  The patient was informed of the risks and the benefits of the procedure and a written consent was signed.  The patient s Left shoulder was prepped with chlorhexidine in sterile fashion.    Local anesthesia was performed using a 27-gauge 1.5-inch needle to administer 3 mL of 1% lidocaine without epi.  80 mg of methylprednisolone suspension was drawn up into a 5 mL syringe with 3 mL of 1% lidocaine w/o Epi.  Injection was performed using sterile technique.  Under ultrasound guidance a 3.5-inch 22-gauge needle was used to enter the glenohumeral joint.  Posterior approach was used with the patient in lateral recumbent position, arm in neutral position at the side.  Needle placement was visualized and documented with ultrasound.  Ultrasound visualization was necessary due to the small joint space entered.  Injection performed long axis to the probe.  Injection solution visualized within the joint space.  Images were permanently stored for the patient's record.  Procedure above repeated for right shoulder using 80 mg of methylprednisolone suspension was drawn up into a 5 mL syringe with 3 mL of 1% lidocaine w/o Epi.   There were no complications. The patient tolerated the procedure well. There was negligible bleeding.   Therapy scheduled to follow for mobilization.  The patient was instructed to call or go to the emergency room with any unusual pain, swelling, redness, or if otherwise  [facility]   ORTHOPEDICS & SPORTS MEDICINE  [address] [place] [address]  Dept: [phone]  ______________________________________________________________________________    Patient: Tameka Mcdaniel   : 1945   MRN: [id number]   2021    INVASIVE PROCEDURE SAFETY CHECKLIST    Date: 21  Procedure:Bilateral shoulder GH injections under ultrasound guidance   Patient Name: Tameka Mcdaniel  MRN: [id number]  YOB: 1945    Action: Complete sections as appropriate. Any discrepancy results in a HARD COPY until resolved.     PRE PROCEDURE:  Patient ID verified with 2 identifiers (name and  or MRN): Yes  Procedure and site verified with patient/designee (when able): Yes  Accurate consent documentation in medical record: Yes  H&P (or appropriate assessment) documented in medical record: NA  H&P must be up to 20 days prior to procedure and updates within 24 hours of procedure as applicable: NA  Relevant diagnostic and radiology test results appropriately labeled and displayed as applicable: Yes  Procedure site(s) marked with provider initials: NA    TIMEOUT:  Time-Out performed immediately prior to starting procedure, including verbal and active participation of all team members addressing the following:Yes  * Correct patient identify  * Confirmed that the correct side and site are marked  * An accurate procedure consent form  * Agreement on the procedure to be done  * Correct patient position  * Relevant images and results are properly labeled and appropriately displayed  * The need to administer antibiotics or fluids for irrigation purposes during the procedure as applicable   * Safety precautions based on patient history or medication use    DURING PROCEDURE: Verification of correct person, site, and procedures any time the responsibility for care of the patient is transferred to another member of the care team.       Prior to injection, verified patient  identity using patient's name and date of birth.  Due to injection administration, patient instructed to remain in clinic for 15 minutes  afterwards, and to report any adverse reaction to me immediately.    Joint injection was performed.      Drug Amount Wasted:  None.  Vial/Syringe: Single dose vial  Expiration Date:  01/01/2022      Graciela FLORESBebeto Rosy, ATC  July 9, 2021    Large Joint Injection/Arthocentesis: bilateral glenohumeral    Date/Time: 7/9/2021 10:56 AM  Performed by: Pelon Arce DO  Authorized by: Pelon Arce DO     Indications:  Pain  Needle Size:  22 G  Guidance: ultrasound    Approach:  Posterolateral  Location:  Shoulder  Laterality:  Bilateral      Site:  Bilateral glenohumeral  Medications (Right):  80 mg methylPREDNISolone 80 MG/ML  Medications (Left):  80 mg methylPREDNISolone 80 MG/ML  Procedure discussed: discussed risks, benefits, and alternatives    Consent Given by:  Patient  Timeout: timeout called immediately prior to procedure    Prep: patient was prepped and draped in usual sterile fashion     NDC: 4817-9000-66          Again, thank you for allowing me to participate in the care of your patient.        Sincerely,        Pelon Arce DO

## 2021-07-09 NOTE — PATIENT INSTRUCTIONS
Thanks for coming today.  Ortho/Sports Medicine Clinic  [address] [place] [address]    To schedule future appointments in Ortho Clinic, you may call [phone].    To schedule ordered imaging by your provider:   Call Central Imaging [phone]    To schedule an injection ordered by your provider:  Call Central Imaging Injection scheduling line: [phone]  [facility] available online at:  [email or web address]    Please call if any further questions or concerns ([phone]).  Clinic hours 8 am to 5 pm.    Return to clinic (call) if symptoms worsen or fail to improve.